# Patient Record
Sex: MALE | Race: BLACK OR AFRICAN AMERICAN | Employment: FULL TIME | URBAN - METROPOLITAN AREA
[De-identification: names, ages, dates, MRNs, and addresses within clinical notes are randomized per-mention and may not be internally consistent; named-entity substitution may affect disease eponyms.]

---

## 2020-11-30 ENCOUNTER — HOSPITAL ENCOUNTER (OUTPATIENT)
Age: 25
Discharge: HOME | End: 2020-11-30

## 2020-11-30 VITALS
HEART RATE: 74 BPM | SYSTOLIC BLOOD PRESSURE: 126 MMHG | RESPIRATION RATE: 20 BRPM | OXYGEN SATURATION: 99 % | TEMPERATURE: 97 F | DIASTOLIC BLOOD PRESSURE: 84 MMHG

## 2020-11-30 DIAGNOSIS — H92.01 RIGHT EAR PAIN: ICD-10-CM

## 2020-11-30 DIAGNOSIS — J02.0 STREPTOCOCCAL PHARYNGITIS: Primary | ICD-10-CM

## 2020-11-30 PROCEDURE — 99202 OFFICE O/P NEW SF 15 MIN: CPT | Performed by: NURSE PRACTITIONER

## 2020-11-30 PROCEDURE — 87880 STREP A ASSAY W/OPTIC: CPT | Performed by: NURSE PRACTITIONER

## 2020-11-30 RX ORDER — DEXAMETHASONE SODIUM PHOSPHATE 10 MG/ML
10 INJECTION, SOLUTION INTRAMUSCULAR; INTRAVENOUS ONCE
Status: COMPLETED | OUTPATIENT
Start: 2020-11-30 | End: 2020-11-30

## 2020-11-30 RX ORDER — AMOXICILLIN 500 MG/1
500 TABLET, FILM COATED ORAL 2 TIMES DAILY
Qty: 20 TABLET | Refills: 0 | Status: SHIPPED | OUTPATIENT
Start: 2020-11-30 | End: 2020-12-10

## 2020-11-30 NOTE — ED PROVIDER NOTES
Patient Seen in: [facility]      History   Patient presents with:  Sore Throat    Stated Complaint: SORE THROAT    HPI    This is a 22-year-old male presenting for sore throat. Patient states, that he has had sore throat since Saturday.   Pa hot potato voice  Eyes:      Extraocular Movements: Extraocular movements intact. Conjunctiva/sclera: Conjunctivae normal.      Pupils: Pupils are equal, round, and reactive to light.    Neck:      Musculoskeletal: Normal range of motion and neck suppl 21     Schedule an appointment as soon as possible for a visit in 2 days      Juli Bernheim, MD  [id number]  [address] [place] [address] [phone]    In 3 days  If symptoms worsen or persisit          Medications Prescribed:

## 2020-11-30 NOTE — ED INITIAL ASSESSMENT (HPI)
Pt states having a sore throat since Friday that has become progressively worse. Pt states having some right ear has some congestion. Pt denies fever. Pt states having a few more BM then usual. Pt states unable to sleep due to pain.

## (undated) NOTE — LETTER
Date & Time: 11/30/2020, 12:14 PM  Patient: Medhat Re  Encounter Provider(s):    GITA Bustos       To Whom It May Concern:    Aleksandra To was seen and treated in our department on 11/30/2020.  He should not return to work [employer]

## (undated) NOTE — LETTER
Date & Time: 11/30/2020, 12:19 PM  Patient: Nikki Free  Encounter Provider(s):    GITA Bar       To Whom It May Concern:    Eleanor Estrada was seen and treated in our department on 11/30/2020.  He should not return to work [employer]